# Patient Record
Sex: MALE | Race: WHITE | Employment: OTHER | URBAN - METROPOLITAN AREA
[De-identification: names, ages, dates, MRNs, and addresses within clinical notes are randomized per-mention and may not be internally consistent; named-entity substitution may affect disease eponyms.]

---

## 2017-02-21 ENCOUNTER — HOSPITAL ENCOUNTER (EMERGENCY)
Facility: CLINIC | Age: 72
Discharge: HOME | End: 2017-02-21
Attending: EMERGENCY MEDICINE | Admitting: EMERGENCY MEDICINE
Payer: MEDICARE

## 2017-02-21 VITALS
HEIGHT: 68 IN | SYSTOLIC BLOOD PRESSURE: 162 MMHG | WEIGHT: 180 LBS | TEMPERATURE: 97.2 F | BODY MASS INDEX: 27.28 KG/M2 | RESPIRATION RATE: 16 BRPM | OXYGEN SATURATION: 97 % | DIASTOLIC BLOOD PRESSURE: 100 MMHG | HEART RATE: 90 BPM

## 2017-02-21 DIAGNOSIS — N39.0 URINARY TRACT INFECTION, SITE UNSPECIFIED: ICD-10-CM

## 2017-02-21 LAB
ALBUMIN UR-MCNC: 100 MG/DL
ANION GAP SERPL CALCULATED.3IONS-SCNC: 5 MMOL/L (ref 3–14)
APPEARANCE UR: ABNORMAL
BASOPHILS # BLD AUTO: 0 10E9/L (ref 0–0.2)
BASOPHILS NFR BLD AUTO: 0.1 %
BILIRUB UR QL STRIP: NEGATIVE
BUN SERPL-MCNC: 11 MG/DL (ref 7–30)
CALCIUM SERPL-MCNC: 8.8 MG/DL (ref 8.5–10.1)
CHLORIDE SERPL-SCNC: 105 MMOL/L (ref 94–109)
CO2 SERPL-SCNC: 30 MMOL/L (ref 20–32)
COLOR UR AUTO: YELLOW
CREAT SERPL-MCNC: 0.95 MG/DL (ref 0.66–1.25)
DIFFERENTIAL METHOD BLD: ABNORMAL
EOSINOPHIL # BLD AUTO: 0 10E9/L (ref 0–0.7)
EOSINOPHIL NFR BLD AUTO: 0.5 %
ERYTHROCYTE [DISTWIDTH] IN BLOOD BY AUTOMATED COUNT: 12.5 % (ref 10–15)
GFR SERPL CREATININE-BSD FRML MDRD: 78 ML/MIN/1.7M2
GLUCOSE SERPL-MCNC: 112 MG/DL (ref 70–99)
GLUCOSE UR STRIP-MCNC: NEGATIVE MG/DL
HCT VFR BLD AUTO: 38.8 % (ref 40–53)
HGB BLD-MCNC: 13.8 G/DL (ref 13.3–17.7)
HGB UR QL STRIP: ABNORMAL
IMM GRANULOCYTES # BLD: 0 10E9/L (ref 0–0.4)
IMM GRANULOCYTES NFR BLD: 0.1 %
KETONES UR STRIP-MCNC: NEGATIVE MG/DL
LEUKOCYTE ESTERASE UR QL STRIP: ABNORMAL
LYMPHOCYTES # BLD AUTO: 0.5 10E9/L (ref 0.8–5.3)
LYMPHOCYTES NFR BLD AUTO: 7.1 %
MCH RBC QN AUTO: 30.7 PG (ref 26.5–33)
MCHC RBC AUTO-ENTMCNC: 35.6 G/DL (ref 31.5–36.5)
MCV RBC AUTO: 86 FL (ref 78–100)
MONOCYTES # BLD AUTO: 0.4 10E9/L (ref 0–1.3)
MONOCYTES NFR BLD AUTO: 5.9 %
MUCOUS THREADS #/AREA URNS LPF: PRESENT /LPF
NEUTROPHILS # BLD AUTO: 6.4 10E9/L (ref 1.6–8.3)
NEUTROPHILS NFR BLD AUTO: 86.3 %
NITRATE UR QL: NEGATIVE
NRBC # BLD AUTO: 0 10*3/UL
NRBC BLD AUTO-RTO: 0 /100
PH UR STRIP: 7.5 PH (ref 5–7)
PLATELET # BLD AUTO: 236 10E9/L (ref 150–450)
POTASSIUM SERPL-SCNC: 3.8 MMOL/L (ref 3.4–5.3)
RBC # BLD AUTO: 4.5 10E12/L (ref 4.4–5.9)
RBC #/AREA URNS AUTO: >182 /HPF (ref 0–2)
SODIUM SERPL-SCNC: 140 MMOL/L (ref 133–144)
SP GR UR STRIP: 1.01 (ref 1–1.03)
URN SPEC COLLECT METH UR: ABNORMAL
UROBILINOGEN UR STRIP-MCNC: NORMAL MG/DL (ref 0–2)
WBC # BLD AUTO: 7.4 10E9/L (ref 4–11)
WBC #/AREA URNS AUTO: >182 /HPF (ref 0–2)
WBC CLUMPS #/AREA URNS HPF: PRESENT /HPF

## 2017-02-21 PROCEDURE — 80048 BASIC METABOLIC PNL TOTAL CA: CPT | Performed by: EMERGENCY MEDICINE

## 2017-02-21 PROCEDURE — 51798 US URINE CAPACITY MEASURE: CPT

## 2017-02-21 PROCEDURE — 99283 EMERGENCY DEPT VISIT LOW MDM: CPT

## 2017-02-21 PROCEDURE — 99284 EMERGENCY DEPT VISIT MOD MDM: CPT

## 2017-02-21 PROCEDURE — 87086 URINE CULTURE/COLONY COUNT: CPT | Performed by: EMERGENCY MEDICINE

## 2017-02-21 PROCEDURE — 87088 URINE BACTERIA CULTURE: CPT | Performed by: EMERGENCY MEDICINE

## 2017-02-21 PROCEDURE — 85025 COMPLETE CBC W/AUTO DIFF WBC: CPT | Performed by: EMERGENCY MEDICINE

## 2017-02-21 PROCEDURE — A9270 NON-COVERED ITEM OR SERVICE: HCPCS | Mod: GY | Performed by: EMERGENCY MEDICINE

## 2017-02-21 PROCEDURE — 87186 SC STD MICRODIL/AGAR DIL: CPT | Performed by: EMERGENCY MEDICINE

## 2017-02-21 PROCEDURE — 81001 URINALYSIS AUTO W/SCOPE: CPT | Performed by: EMERGENCY MEDICINE

## 2017-02-21 PROCEDURE — 25000132 ZZH RX MED GY IP 250 OP 250 PS 637: Mod: GY | Performed by: EMERGENCY MEDICINE

## 2017-02-21 RX ORDER — CIPROFLOXACIN 500 MG/1
500 TABLET, FILM COATED ORAL ONCE
Status: COMPLETED | OUTPATIENT
Start: 2017-02-21 | End: 2017-02-21

## 2017-02-21 RX ORDER — CIPROFLOXACIN 500 MG/1
500 TABLET, FILM COATED ORAL 2 TIMES DAILY
Qty: 14 TABLET | Refills: 0 | Status: SHIPPED | OUTPATIENT
Start: 2017-02-21 | End: 2017-02-28

## 2017-02-21 RX ORDER — PHENAZOPYRIDINE HYDROCHLORIDE 200 MG/1
200 TABLET, FILM COATED ORAL 3 TIMES DAILY PRN
Qty: 9 TABLET | Refills: 0 | Status: SHIPPED | OUTPATIENT
Start: 2017-02-21 | End: 2017-02-24

## 2017-02-21 RX ORDER — CEFTRIAXONE 1 G/1
1 INJECTION, POWDER, FOR SOLUTION INTRAMUSCULAR; INTRAVENOUS ONCE
Status: DISCONTINUED | OUTPATIENT
Start: 2017-02-21 | End: 2017-02-21

## 2017-02-21 RX ADMIN — CIPROFLOXACIN HYDROCHLORIDE 500 MG: 500 TABLET, FILM COATED ORAL at 13:26

## 2017-02-21 ASSESSMENT — ENCOUNTER SYMPTOMS
DYSURIA: 1
ABDOMINAL PAIN: 0
BACK PAIN: 0
FREQUENCY: 1
HEMATURIA: 1
DIFFICULTY URINATING: 1

## 2017-02-21 NOTE — DISCHARGE INSTRUCTIONS
"   * BLADDER INFECTION: Male (Adult)    A bladder infection (\"cystitis\" or \"UTI\") usually causes a constant urge to urinate, and a burning when passing urine. Urine may be cloudy, smelly or dark. There may be also be pain in the lower abdomen.  Cystitis in males is not common. It may be caused by a partial blockage in the urinary system that keeps the bladder from emptying completely. This is most often related to an enlarged prostate gland.  HOME CARE:  1. Drink lots of fluids (at least 6-8 glasses a day). This will flush the bacteria out of your bladder. Cranberry juice has been shown to help clear out the bacteria.  2. Avoid sexual intercourse until your symptoms are gone.  3. A bladder infection is treated with antibiotics. You may also be given Pyridium (generic - phenazopyridine) to reduce burning with urination. This will cause urine to become a bright orange color, which can stain clothing.  FOLLOW UP with your doctor or this facility if ALL symptoms have not cleared within five days. It is important to keep your follow up appointment to discuss with your doctor the need for further tests of the urinary tract.  GET PROMPT MEDICAL ATTENTION if any of the following occur:    Fever over 101  F (38.3  C)    No improvement by the third day of treatment    Increasing back or abdominal pain    Repeated vomiting; unable to keep medicine down    Weakness, dizziness or fainting    7351-0458 [employer], [address] [place] [address], [place] [address]. All rights reserved. This information is not intended as a substitute for professional medical care. Always follow your healthcare professional's instructions.    "

## 2017-02-21 NOTE — ED AVS SNAPSHOT
"  Emergency Department    [address] [facility] [address]    Phone:  [phone]    Fax:  [phone]                                       Chuck Santacruz   MRN: [id number]    Department:   Emergency Department   Date of Visit:  2/21/2017           Patient Information     Date Of Birth          1945        Your diagnoses for this visit were:     Urinary tract infection, site unspecified        You were seen by Nat Jones MD.      Follow-up Information     Follow up with Evens Browning MD.    Specialty:  Urology    Why:  this week for recheck    Contact information:    [facility]  [address]   [address]  [facility] [address] [phone]          Follow up with  Emergency Department.    Specialty:  EMERGENCY MEDICINE    Why:  As needed, If symptoms worsen or for fevers or vomiting    Contact information:    [address] [facility] [address] [phone]        Discharge Instructions          * BLADDER INFECTION: Male (Adult)    A bladder infection (\"cystitis\" or \"UTI\") usually causes a constant urge to urinate, and a burning when passing urine. Urine may be cloudy, smelly or dark. There may be also be pain in the lower abdomen.  Cystitis in males is not common. It may be caused by a partial blockage in the urinary system that keeps the bladder from emptying completely. This is most often related to an enlarged prostate gland.  HOME CARE:  1. Drink lots of fluids (at least 6-8 glasses a day). This will flush the bacteria out of your bladder. Cranberry juice has been shown to help clear out the bacteria.  2. Avoid sexual intercourse until your symptoms are gone.  3. A bladder infection is treated with antibiotics. You may also be given Pyridium (generic - phenazopyridine) to reduce burning with urination. This will cause urine to become a bright orange color, which can stain clothing.  FOLLOW UP with your doctor or this facility if ALL symptoms have " not cleared within five days. It is important to keep your follow up appointment to discuss with your doctor the need for further tests of the urinary tract.  GET PROMPT MEDICAL ATTENTION if any of the following occur:    Fever over 101  F (38.3  C)    No improvement by the third day of treatment    Increasing back or abdominal pain    Repeated vomiting; unable to keep medicine down    Weakness, dizziness or fainting    9237-3816 [employer], [address] [place] [address], [place] [address]. All rights reserved. This information is not intended as a substitute for professional medical care. Always follow your healthcare professional's instructions.      24 Hour Appointment Hotline       To make an appointment at any [facility], call [phone] ([phone]). If you don't have a family doctor or clinic, we will help you find one. [facility] clinics are conveniently located to serve the needs of you and your family.             Review of your medicines      START taking        Dose / Directions Last dose taken    ciprofloxacin 500 MG tablet   Commonly known as:  CIPRO   Dose:  500 mg   Quantity:  14 tablet        Take 1 tablet (500 mg) by mouth 2 times daily for 7 days   Refills:  0        phenazopyridine 200 MG tablet   Commonly known as:  PYRIDIUM   Dose:  200 mg   Quantity:  9 tablet        Take 1 tablet (200 mg) by mouth 3 times daily as needed for irritation (pain with urination)   Refills:  0          Our records show that you are taking the medicines listed below. If these are incorrect, please call your family doctor or clinic.        Dose / Directions Last dose taken    VIAGRA PO   Dose:  100 mg        Take 100 mg by mouth daily as needed   Refills:  0        ZANTAC PO   Dose:  150 mg        Take 150 mg by mouth daily as needed for heartburn   Refills:  0                Prescriptions were sent or printed at these locations (2 Prescriptions)                   Other Prescriptions                 Printed at Department/Unit printer (2 of 2)         ciprofloxacin (CIPRO) 500 MG tablet               phenazopyridine (PYRIDIUM) 200 MG tablet                Procedures and tests performed during your visit     Procedure/Test Number of Times Performed    Basic metabolic panel 1    Bladder scan 1    CBC with platelets + differential 1    UA reflex to Microscopic 2    Urine Culture Aerobic Bacterial 1      Orders Needing Specimen Collection     None      Pending Results     Date and Time Order Name Status Description    2/21/2017 1136 Urine Culture Aerobic Bacterial In process             Pending Culture Results     Date and Time Order Name Status Description    2/21/2017 1136 Urine Culture Aerobic Bacterial In process              Test Results from your hospital stay     2/21/2017 11:48 AM - Interface, Flexilab Results         2/21/2017 12:17 PM - Interface, Flexilab Results      Component Results     Component Value Ref Range & Units Status    WBC 7.4 4.0 - 11.0 10e9/L Final    RBC Count 4.50 4.4 - 5.9 10e12/L Final    Hemoglobin 13.8 13.3 - 17.7 g/dL Final    Hematocrit 38.8 (L) 40.0 - 53.0 % Final    MCV 86 78 - 100 fl Final    MCH 30.7 26.5 - 33.0 pg Final    MCHC 35.6 31.5 - 36.5 g/dL Final    RDW 12.5 10.0 - 15.0 % Final    Platelet Count 236 150 - 450 10e9/L Final    Diff Method Automated Method  Final    % Neutrophils 86.3 % Final    % Lymphocytes 7.1 % Final    % Monocytes 5.9 % Final    % Eosinophils 0.5 % Final    % Basophils 0.1 % Final    % Immature Granulocytes 0.1 % Final    Nucleated RBCs 0 0 /100 Final    Absolute Neutrophil 6.4 1.6 - 8.3 10e9/L Final    Absolute Lymphocytes 0.5 (L) 0.8 - 5.3 10e9/L Final    Absolute Monocytes 0.4 0.0 - 1.3 10e9/L Final    Absolute Eosinophils 0.0 0.0 - 0.7 10e9/L Final    Absolute Basophils 0.0 0.0 - 0.2 10e9/L Final    Abs Immature Granulocytes 0.0 0 - 0.4 10e9/L Final    Absolute Nucleated RBC 0.0  Final         2/21/2017 12:29 PM - Interface, Flexilab Results       Component Results     Component Value Ref Range & Units Status    Sodium 140 133 - 144 mmol/L Final    Potassium 3.8 3.4 - 5.3 mmol/L Final    Chloride 105 94 - 109 mmol/L Final    Carbon Dioxide 30 20 - 32 mmol/L Final    Anion Gap 5 3 - 14 mmol/L Final    Glucose 112 (H) 70 - 99 mg/dL Final    Urea Nitrogen 11 7 - 30 mg/dL Final    Creatinine 0.95 0.66 - 1.25 mg/dL Final    GFR Estimate 78 >60 mL/min/1.7m2 Final    Non  GFR Calc    GFR Estimate If Black >90   GFR Calc   >60 mL/min/1.7m2 Final    Calcium 8.8 8.5 - 10.1 mg/dL Final         2/21/2017 12:49 PM - Interface, Flexilab Results      Component Results     Component Value Ref Range & Units Status    Color Urine Yellow  Final    Appearance Urine Slightly Cloudy  Final    Glucose Urine Negative NEG mg/dL Final    Bilirubin Urine Negative NEG Final    Ketones Urine Negative NEG mg/dL Final    Specific Gravity Urine 1.015 1.003 - 1.035 Final    Blood Urine Large (A) NEG Final    pH Urine 7.5 (H) 5.0 - 7.0 pH Final    Protein Albumin Urine 100 (A) NEG mg/dL Final    Urobilinogen mg/dL Normal 0.0 - 2.0 mg/dL Final    Nitrite Urine Negative NEG Final    Leukocyte Esterase Urine Large (A) NEG Final    Source Midstream Urine  Final    RBC Urine >182 (H) 0 - 2 /HPF Final    WBC Urine >182 (H) 0 - 2 /HPF Final    WBC Clumps Present (A) NEG /HPF Final    Mucous Urine Present (A) NEG /LPF Final                Clinical Quality Measure: Blood Pressure Screening     Your blood pressure was checked while you were in the emergency department today. The last reading we obtained was  BP: (!) 174/104 . Please read the guidelines below about what these numbers mean and what you should do about them.  If your systolic blood pressure (the top number) is less than 120 and your diastolic blood pressure (the bottom number) is less than 80, then your blood pressure is normal. There is nothing more that you need to do about it.  If your systolic blood  "pressure (the top number) is 120-139 or your diastolic blood pressure (the bottom number) is 80-89, your blood pressure may be higher than it should be. You should have your blood pressure rechecked within a year by a primary care provider.  If your systolic blood pressure (the top number) is 140 or greater or your diastolic blood pressure (the bottom number) is 90 or greater, you may have high blood pressure. High blood pressure is treatable, but if left untreated over time it can put you at risk for heart attack, stroke, or kidney failure. You should have your blood pressure rechecked by a primary care provider within the next 4 weeks.  If your provider in the emergency department today gave you specific instructions to follow-up with your doctor or provider even sooner than that, you should follow that instruction and not wait for up to 4 weeks for your follow-up visit.        Thank you for choosing [facility]       Thank you for choosing [facility] for your care. Our goal is always to provide you with excellent care. Hearing back from our patients is one way we can continue to improve our services. Please take a few minutes to complete the written survey that you may receive in the mail after you visit with us. Thank you!        [employer] Information     [employer] lets you send messages to your doctor, view your test results, renew your prescriptions, schedule appointments and more. To sign up, go to [email or web address] . Click on \"Log in\" on the left side of the screen, which will take you to the Welcome page. Then click on \"Sign up Now\" on the right side of the page.     You will be asked to enter the access code listed below, as well as some personal information. Please follow the directions to create your username and password.     Your access code is: [email or web address]  Expires: 2017 12:27 PM     Your access code will  in 90 days. If you need help or a new code, please call your [facility] clinic or " [phone].        Care EveryWhere ID     This is your Care EveryWhere ID. This could be used by other organizations to access your [facility] medical records  [id number]        After Visit Summary       This is your record. Keep this with you and show to your community pharmacist(s) and doctor(s) at your next visit.

## 2017-02-21 NOTE — ED AVS SNAPSHOT
Emergency Department    [address] [place] [address]    Phone:  [phone]    Fax:  [phone]                                       Chuck Santacruz   MRN: [id number]    Department:   Emergency Department   Date of Visit:  2/21/2017           After Visit Summary Signature Page     I have received my discharge instructions, and my questions have been answered. I have discussed any challenges I see with this plan with the nurse or doctor.    ..........................................................................................................................................  Patient/Patient Representative Signature      ..........................................................................................................................................  Patient Representative Print Name and Relationship to Patient    ..................................................               ................................................  Date                                            Time    ..........................................................................................................................................  Reviewed by Signature/Title    ...................................................              ..............................................  Date                                                            Time

## 2017-02-21 NOTE — ED PROVIDER NOTES
"  History     Chief Complaint:  Urinary Retention    HPI   Chuck Santacruz is a 71 year old male who presents to the emergency department today for evaluation of urinary retention. The patient reports that he had a prostatectomy over a year ago for prostate cancer that was performed by Dr. Browning. The patient reports that he recovered well from this procedure, however he reports that ever since his prostate cancer he has not had normal urinary function. He states that he thinks the radiation \"messed the whole system up.\" Recently, the patient has felt a constant urgency to urinate but not much urine comes out. The patient reports that last night he was waking up every half hour to urinate because he feels such urgency, but he states that he doesn't feel like he is full of urine. He reports that he has noticed some dysuria and he has noticed some whitish substance in his urine. He reports that he contacted his primary care physician who told him to present to the emergency department. The patient has no history of urine infections and he denies any back pain or abdominal pain. This morning the patient noted some brownish-yellowish discharge in a portion of his urine.     Allergies:  Atorvastatin  Citalopram Hydrobromide [Citalopram]  Sertraline     Medications:    Zantac  Viagra    Past Medical History:    Erectile Dysfunction  GERD  Hiatal Hernia  Hyperlipidemia  Irritable Bowel Syndrome  Multiple adenomatous polyps  Prostate cancer  Tubular adenoma of colon    Past Surgical History:    Small bowel resection  Knee arthroscopy  Eye surgery - cataract  DaVinci Prostatectomy    Family History:    No family history on file.    Social History:  Smoking Status: Never Smoker  Alcohol Use: Positive  Marital Status:  Single [1]     Review of Systems   Gastrointestinal: Negative for abdominal pain.   Genitourinary: Positive for decreased urine volume, difficulty urinating, discharge, dysuria, frequency, hematuria and urgency. " "  Musculoskeletal: Negative for back pain.   All other systems reviewed and are negative.    Physical Exam   Vitals:  Patient Vitals for the past 24 hrs:   BP Temp Temp src Pulse Heart Rate Resp SpO2 Height Weight   02/21/17 1106 (!) 174/104 97.2  F (36.2  C) Temporal 90 90 18 97 % 1.727 m (5' 8\") 81.6 kg (180 lb)      Physical Exam   Nursing note and vitals reviewed.  Constitutional:  Appears well-developed and well-nourished.   HENT:   Head:    Atraumatic.   Mouth/Throat:   Oropharynx is clear and moist. No oropharyngeal exudate.   Eyes:    Pupils are equal, round, and reactive to light.   Neck:    Normal range of motion. Neck supple.      No tracheal deviation present. No thyromegaly present.   Cardiovascular:  Normal rate, regular rhythm, no murmur   Pulmonary/Chest: Breath sounds are clear and equal without wheezes or crackles.  Abdominal:   Soft. Bowel sounds are normal. Exhibits no distension and      no mass. There is no tenderness.      There is no rebound and no guarding.   Musculoskeletal:  Exhibits no edema.   Lymphadenopathy:  No cervical adenopathy.   Neurological:   Alert and oriented to person, place, and time.   Skin:    Skin is warm and dry. No rash noted. No pallor.   :   Circumcised penis appears normal. No drainage.     Emergency Department Course     Laboratory:  Laboratory findings were communicated with the patient who voiced understanding of the findings.    CBC: WBC 7.4, HGB 13.8, )   BMP: Glucose 112 (H) o/w WNL (Creatinine 0.95)  UA with Microscopic reflex to Culture: Blood: Large (A), pH: 7.5 (H), Protein Albumin: 100 (A),  Leukocyte Esterase: Large (A), RBC/HPF: >182 (H), WBC/HPF: >182 (H), WBC Clumps:  Present (A), Mucous: Present (A)  Urine Culture Aerobic Bacterial: Pending    Interventions:  1326 Cipro 500 mg PO    Emergency Department Course:  Nursing notes and vitals reviewed.  I performed an exam of the patient as documented above.   The patient provided a urine sample " here in the emergency department. This was sent for laboratory testing, findings above.  I discussed the treatment plan with the patient. They expressed understanding of this plan and consented to discharge. They will be discharged home with instructions for care and follow up. In addition, the patient will return to the emergency department if their symptoms persist, worsen, if new symptoms arise or if there is any concern.  All questions were answered.  I personally reviewed the lab results with the patient and answered all related questions prior to discharge.  Impression & Plan      Medical Decision Making:  I found the patient to have symptoms and urinalysis consistent with an acute urinary tract infection. I considered kidney stone in the differential but I felt that this was unlikely. The patient was given Cipro, 500 mg PO here, and he was not having any sign of urinary obstruction. His bladder scan did not show any post void residual so I felt that he could be safely discharged to home. His kidney function is normal and he does not appear to have any signs of sepsis. He was prescribed Cipro 500 mg twice daily for seven days and Pyridium for burning with urination and instructed to drink plenty of fluids and told to follow up with his urologist Dr. Browning later this week and instructed on signs and symptoms to return to the emergency department for.     Diagnosis:    ICD-10-CM    1. Urinary tract infection, site unspecified N39.0      Disposition:   The patient is discharged to home.    Discharge Medications:  New Prescriptions    CIPROFLOXACIN (CIPRO) 500 MG TABLET    Take 1 tablet (500 mg) by mouth 2 times daily for 7 days    PHENAZOPYRIDINE (PYRIDIUM) 200 MG TABLET    Take 1 tablet (200 mg) by mouth 3 times daily as needed for irritation (pain with urination)     Scribe Disclosure:  Chandler VILLA, am serving as a scribe at 11:26 AM on 2/21/2017 to document services personally performed by Nat Jones,  MD, based on my observations and the provider's statements to me.    2/21/2017    EMERGENCY DEPARTMENT       Nat Jones MD  02/21/17 2004

## 2017-02-23 LAB
BACTERIA SPEC CULT: ABNORMAL
Lab: ABNORMAL
MICRO REPORT STATUS: ABNORMAL
MICROORGANISM SPEC CULT: ABNORMAL
SPECIMEN SOURCE: ABNORMAL

## 2017-02-24 ENCOUNTER — TELEPHONE (OUTPATIENT)
Dept: EMERGENCY MEDICINE | Facility: CLINIC | Age: 72
End: 2017-02-24

## 2017-02-24 NOTE — TELEPHONE ENCOUNTER
"[facility] Emergency Department Lab result notification:    [facility] ED lab result protocol used  Urine Culture    Reason for call  Notify of lab results, assess symptoms,  review ED providers recommendations/discharge instructions (if necessary) and advise per ED lab result f/u protocol    Lab Result  Final Urine Culture Report on 2/23/17  [facility] ED discharge antibiotic: Ciprofloxacin (Cipro) 500 mg tablet, 1 tablet (500 mg) by mouth 2 times daily for 7 days  #1. Bacteria, >100,000 colonies/mL Escherichia coli, is SUSCEPTIBLE to ED discharge antibiotic.    As per [facility] ED Lab Result protocol, no change in antibiotic therapy.    Information table from ED Provider visit on 2/21/17  Symptoms reported at ED visit (Chief complaint, HPI) Chuck Santacruz is a 71 year old male who presents to the emergency department today for evaluation of urinary retention. The patient reports that he had a prostatectomy over a year ago for prostate cancer that was performed by Dr. Browning. The patient reports that he recovered well from this procedure, however he reports that ever since his prostate cancer he has not had normal urinary function. He states that he thinks the radiation \"messed the whole system up.\" Recently, the patient has felt a constant urgency to urinate but not much urine comes out. The patient reports that last night he was waking up every half hour to urinate because he feels such urgency, but he states that he doesn't feel like he is full of urine. He reports that he has noticed some dysuria and he has noticed some whitish substance in his urine. He reports that he contacted his primary care physician who told him to present to the emergency department. The patient has no history of urine infections and he denies any back pain or abdominal pain. This morning the patient noted some brownish-yellowish discharge in a portion of his urine.    ED providers Impression and Plan (applicable " information) I found the patient to have symptoms and urinalysis consistent with an acute urinary tract infection. I considered kidney stone in the differential but I felt that this was unlikely. The patient was given Cipro, 500 mg PO here, and he was not having any sign of urinary obstruction. His bladder scan did not show any post void residual so I felt that he could be safely discharged to home. His kidney function is normal and he does not appear to have any signs of sepsis. He was prescribed Cipro 500 mg twice daily for seven days and Pyridium for burning with urination and instructed to drink plenty of fluids and told to follow up with his urologist Dr. Browning later this week and instructed on signs and symptoms to return to the emergency department for.    Miscellaneous information N/A     RN Assessment (Patient s current Symptoms), include time called.  [Insert Left message here if message left]  Msg left at 1:34pm.         Radha Berger RN    [facility] RN  Lung Nodule and ED Lab Results F/U RN  Epic pool (ED late result f/u RN) : P [id number]   # [phone]    Copy of Lab result   Order   Urine Culture Aerobic Bacterial [[id number]] (Order [id number])   Exam Information   Exam Date Exam Time Accession # Results    2/21/17 11:08 AM [id number]    Component Results   Component Collected Lab   Specimen Description 02/21/2017 11:08 AM [id number]   Midstream Urine   Special Requests 02/21/2017 11:08 AM 75   Specimen received in preservative   Culture Micro (Abnormal) 02/21/2017 11:08    >100,000 colonies/mL Escherichia coli   Micro Report Status 02/21/2017 11:08    FINAL 02/23/2017   Organism: 02/21/2017 11:08    >100,000 colonies/mL Escherichia coli   Culture & Susceptibility   >100,000 COLONIES/ML ESCHERICHIA COLI (ANDI)   Antibiotic Sensitivity Unit Status   AMPICILLIN <=2 Susceptible ug/mL Final   AMPICILLIN/SULBACTAM <=2 Susceptible ug/mL Final   CEFAZOLIN <=4  Susceptible  Cefazolin ANDI breakpoints are for the treatment of uncomplicated urinary tract   infections.  For the treatment of systemic infections, please contact the   laboratory for additional testing. ug/mL Final   CEFEPIME <=1 Susceptible ug/mL Final   CEFOXITIN <=4 Susceptible ug/mL Final   CEFTAZIDIME <=1 Susceptible ug/mL Final   CEFTRIAXONE <=1 Susceptible ug/mL Final   CIPROFLOXACIN <=0.25 Susceptible ug/mL Final   GENTAMICIN <=1 Susceptible ug/mL Final   LEVOFLOXACIN <=0.12 Susceptible ug/mL Final   NITROFURANTOIN <=16 Susceptible ug/mL Final   Piperacillin/Tazo <=4 Susceptible ug/mL Final   TOBRAMYCIN <=1 Susceptible ug/mL Final   Trimethoprim/Sulfa <=1/19 Susceptible ug/mL Final

## 2022-10-07 NOTE — PROGRESS NOTES
"BP:179/85   Disposition: provider notified while patient in the clinic  Recheck BP: 171/90      SUBJECTIVE:   Chuck is a 77 year old who presents for Preventive Visit.  Pt is new to me as well as the clinic/system: moved here recently from [facility].      Prostate CA:  S/p prostatectomy.  Rtx also.  Due for surveillance PSA.      Anxiety:  Describes himself as an anxious person.  Especially at present. 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      BP:  Noted below.  He exercises regularly.  He notes that typically his BP does run a bit high.  Gets 140-150s systolic.        Patient has been advised of split billing requirements and indicates understanding: Yes  Are you in the first 12 months of your Medicare coverage?  No    Healthy Habits:     In general, how would you rate your overall health?  Excellent    Frequency of exercise:  6-7 days/week    Duration of exercise:  45-60 minutes    Do you usually eat at least 4 servings of fruit and vegetables a day, include whole grains    & fiber and avoid regularly eating high fat or \"junk\" foods?  Yes    Taking medications regularly:  Yes    Barriers to taking medications:  Not applicable    Medication side effects:  None    Ability to successfully perform activities of daily living:  No assistance needed    Home Safety:  No safety concerns identified    Hearing Impairment:  No hearing concerns    In the past 6 months, have you been bothered by leaking of urine? Yes    In general, how would you rate your overall mental or emotional health?  Good      PHQ-2 Total Score: 0    Additional concerns today:  No    Do you feel safe in your environment? Yes    Have you ever done Advance Care Planning? (For example, a Health Directive, POLST, or a discussion with a medical provider or your loved ones about your wishes): Yes, advance care planning is on file.       Fall risk  Fallen 2 or more times in the past year?: No  Any fall with injury in " the past year?: No    Cognitive Screening   1) Repeat 3 items (Leader, Season, Table)    2) Clock draw: NORMAL  3) 3 item recall: Recalls 2 objects   Results: NORMAL clock, 1-2 items recalled: COGNITIVE IMPAIRMENT LESS LIKELY    Mini-CogTM Copyright MARCELINO Curiel. Licensed by the author for use in [facility]; reprinted with permission (mahsa@[facility]). All rights reserved.      Do you have sleep apnea, excessive snoring or daytime drowsiness?: no    Reviewed and updated as needed this visit by clinical staff   Tobacco  Allergies               Reviewed and updated as needed this visit by Provider                 Social History     Tobacco Use     Smoking status: Never     Smokeless tobacco: Never   Substance Use Topics     Alcohol use: Yes     Comment: 2 beers per week     If you drink alcohol do you typically have >3 drinks per day or >7 drinks per week? No    Alcohol Use 10/10/2022   Prescreen: >3 drinks/day or >7 drinks/week? No   Prescreen: >3 drinks/day or >7 drinks/week? -   No flowsheet data found.        Current providers sharing in care for this patient include:   Patient Care Team:  Car Marshall MD as PCP - General (Internal Medicine)    The following health maintenance items are reviewed in Epic and correct as of today:  Health Maintenance   Topic Date Due     ANNUAL REVIEW OF HM ORDERS  Never done     HEPATITIS B IMMUNIZATION (1 of 3 - 3-dose series) Never done     HEPATITIS C SCREENING  Never done     LIPID  Never done     ZOSTER IMMUNIZATION (1 of 2) Never done     COVID-19 Vaccine (5 - Booster for Moderna series) 06/03/2022     MEDICARE ANNUAL WELLNESS VISIT  10/10/2023     FALL RISK ASSESSMENT  10/10/2023     ADVANCE CARE PLANNING  10/10/2027     DTAP/TDAP/TD IMMUNIZATION (3 - Td or Tdap) 04/10/2028     PHQ-2 (once per calendar year)  Completed     INFLUENZA VACCINE  Completed     Pneumococcal Vaccine: 65+ Years  Completed     IPV IMMUNIZATION  Aged Out     MENINGITIS IMMUNIZATION  Aged  "Out     Lab work is in process          Review of Systems   Constitutional: Negative for chills and fever.   HENT: Negative for congestion, ear pain, hearing loss and sore throat.    Eyes: Negative for pain and visual disturbance.   Respiratory: Negative for cough and shortness of breath.    Cardiovascular: Negative for chest pain, palpitations and peripheral edema.   Gastrointestinal: Negative for abdominal pain, constipation, diarrhea, heartburn, hematochezia and nausea.   Genitourinary: Positive for impotence. Negative for dysuria, frequency, genital sores, hematuria, penile discharge and urgency.   Musculoskeletal: Negative for arthralgias, joint swelling and myalgias.   Skin: Negative for rash.   Neurological: Negative for dizziness, weakness, headaches and paresthesias.   Psychiatric/Behavioral: Negative for mood changes. The patient is nervous/anxious.          OBJECTIVE:   BP (!) 166/94   Pulse 66   Temp 98.7  F (37.1  C) (Temporal)   Resp 16   Ht 1.727 m (5' 8\")   Wt 79.8 kg (176 lb)   SpO2 99%   BMI 26.76 kg/m   Estimated body mass index is 26.76 kg/m  as calculated from the following:    Height as of this encounter: 1.727 m (5' 8\").    Weight as of this encounter: 79.8 kg (176 lb).  Physical Exam  GENERAL: healthy, alert and no distress  NECK: no adenopathy, no asymmetry, masses, or scars and thyroid normal to palpation  RESP: lungs clear to auscultation - no rales, rhonchi or wheezes  CV: regular rate and rhythm, normal S1 S2, no S3 or S4, no murmur, click or rub, no peripheral edema and peripheral pulses strong  ABDOMEN: soft, nontender, no hepatosplenomegaly, no masses and bowel sounds normal  MS: no gross musculoskeletal defects noted, no edema        ASSESSMENT / PLAN:       ICD-10-CM    1. Encounter for Medicare annual wellness exam   Age and gender appropriate preventive care and screenings are discussed.  Particular attention to personal preventive care and age appropriate lifestyle " "including the incorporation of healthy diet and physical activity is made.     Z00.00       2. Need for hepatitis C screening test  Z11.59 Hepatitis C Screen Reflex to HCV RNA Quant and Genotype      3. Screening for hyperlipidemia  Z13.220 Lipid panel reflex to direct LDL Non-fasting      4. CA of prostate (HCC)   Check PSA C61 Comprehensive metabolic panel (BMP + Alb, Alk Phos, ALT, AST, Total. Bili, TP)     TSH with free T4 reflex      5. Screening for prostate cancer   As above Z12.5 PSA, screen      6. Primary hypertension   Given BP, anxiety and past Bps per his report would suggest medication.  He is agreeable.  Given anxiety will trial low dose beta blocker.  RTC 4 weeks.   I10 Comprehensive metabolic panel (BMP + Alb, Alk Phos, ALT, AST, Total. Bili, TP)     TSH with free T4 reflex     metoprolol succinate ER (TOPROL XL) 25 MG 24 hr tablet     CBC with platelets and differential              COUNSELING:  Reviewed preventive health counseling, as reflected in patient instructions    Estimated body mass index is 26.76 kg/m  as calculated from the following:    Height as of this encounter: 1.727 m (5' 8\").    Weight as of this encounter: 79.8 kg (176 lb).        He reports that he has never smoked. He has never used smokeless tobacco.      Appropriate preventive services were discussed with this patient, including applicable screening as appropriate for cardiovascular disease, diabetes, osteopenia/osteoporosis, and glaucoma.  As appropriate for age/gender, discussed screening for colorectal cancer, prostate cancer, breast cancer, and cervical cancer. Checklist reviewing preventive services available has been given to the patient.    Reviewed patients plan of care and provided an AVS. The Basic Care Plan (routine screening as documented in Health Maintenance) for Chuck meets the Care Plan requirement. This Care Plan has been established and reviewed with the Patient.    Car Marshall MD  [facility] " JOSELYN CHRISTOPHER    Identified Health Risks:    Information on urinary incontinence and treatment options given to patient.

## 2022-10-10 ENCOUNTER — OFFICE VISIT (OUTPATIENT)
Dept: FAMILY MEDICINE | Facility: CLINIC | Age: 77
End: 2022-10-10
Payer: MEDICARE

## 2022-10-10 VITALS
HEIGHT: 68 IN | DIASTOLIC BLOOD PRESSURE: 94 MMHG | TEMPERATURE: 98.7 F | OXYGEN SATURATION: 99 % | BODY MASS INDEX: 26.67 KG/M2 | HEART RATE: 66 BPM | SYSTOLIC BLOOD PRESSURE: 166 MMHG | RESPIRATION RATE: 16 BRPM | WEIGHT: 176 LBS

## 2022-10-10 DIAGNOSIS — C61 CA OF PROSTATE (H): ICD-10-CM

## 2022-10-10 DIAGNOSIS — Z00.00 ENCOUNTER FOR MEDICARE ANNUAL WELLNESS EXAM: Primary | ICD-10-CM

## 2022-10-10 DIAGNOSIS — Z13.220 SCREENING FOR HYPERLIPIDEMIA: ICD-10-CM

## 2022-10-10 DIAGNOSIS — Z12.5 SCREENING FOR PROSTATE CANCER: ICD-10-CM

## 2022-10-10 DIAGNOSIS — Z11.59 NEED FOR HEPATITIS C SCREENING TEST: ICD-10-CM

## 2022-10-10 DIAGNOSIS — I10 PRIMARY HYPERTENSION: ICD-10-CM

## 2022-10-10 PROCEDURE — 99203 OFFICE O/P NEW LOW 30 MIN: CPT | Mod: 25 | Performed by: INTERNAL MEDICINE

## 2022-10-10 PROCEDURE — G0439 PPPS, SUBSEQ VISIT: HCPCS | Performed by: INTERNAL MEDICINE

## 2022-10-10 RX ORDER — METOPROLOL SUCCINATE 25 MG/1
12.5 TABLET, EXTENDED RELEASE ORAL DAILY
Qty: 30 TABLET | Refills: 0 | Status: SHIPPED | OUTPATIENT
Start: 2022-10-10 | End: 2022-11-15

## 2022-10-10 ASSESSMENT — ENCOUNTER SYMPTOMS
HEMATURIA: 0
CHILLS: 0
DIZZINESS: 0
HEMATOCHEZIA: 0
SORE THROAT: 0
CONSTIPATION: 0
HEADACHES: 0
PARESTHESIAS: 0
EYE PAIN: 0
DYSURIA: 0
FREQUENCY: 0
NAUSEA: 0
NERVOUS/ANXIOUS: 1
ABDOMINAL PAIN: 0
JOINT SWELLING: 0
HEARTBURN: 0
SHORTNESS OF BREATH: 0
FEVER: 0
PALPITATIONS: 0
WEAKNESS: 0
MYALGIAS: 0
COUGH: 0
DIARRHEA: 0
ARTHRALGIAS: 0

## 2022-10-10 ASSESSMENT — PAIN SCALES - GENERAL: PAINLEVEL: NO PAIN (0)

## 2022-10-10 ASSESSMENT — ACTIVITIES OF DAILY LIVING (ADL): CURRENT_FUNCTION: NO ASSISTANCE NEEDED

## 2022-10-10 NOTE — PATIENT INSTRUCTIONS
BLOOD tests     START:  Metoprolol once a day    DENTIST:  Nasima Malone  [address], [address]  [place], [place] [address]  P [phone]    See me in 4 weeks    Patient Education   Personalized Prevention Plan  You are due for the preventive services outlined below.  Your care team is available to assist you in scheduling these services.  If you have already completed any of these items, please share that information with your care team to update in your medical record.  Health Maintenance Due   Topic Date Due    ANNUAL REVIEW OF HM ORDERS  Never done    Hepatitis B Vaccine (1 of 3 - 3-dose series) Never done    Hepatitis C Screening  Never done    Cholesterol Lab  Never done    Zoster (Shingles) Vaccine (1 of 2) Never done    COVID-19 Vaccine (5 - Booster for Moderna series) 06/03/2022       Urinary Incontinence (Male)    Urinary incontinence means not being able to control the release of urine from the bladder.   Causes  Common causes of urinary incontinence in men include:  Infection  Certain medicines  Aging  Poor pelvic muscle tone  Bladder spasms  Obesity  Trouble urinating and fully emptying the bladder (urinary retention)  Other things that can cause incontinence are:   Nervous system diseases  Diabetes  Sleep apnea  Urinary tract infections  Prostate surgery  Pelvic injury  Constipation and smoking have also been identified as risk factors.   Symptoms  Urge incontinence (overactive bladder). This is a sudden urge to urinate. It occurs even though there may not be much urine in the bladder. The need to urinate often during the night is common. It's due to bladder spasms.  Stress incontinence. This is urine leakage that you can't control. It can occur with sneezing, coughing, and other actions that put stress on the bladder.    Treatment  Treatment depends on what is causing the condition. Bladder infections are treated with antibiotics. Urinary retention is treated with a bladder catheter.   Home  care  Follow these guidelines when caring for yourself at home:  Don't have any foods and drinks that may irritate the bladder. This includes:  Chocolate  Alcohol  Caffeine  Carbonated drinks  Acidic fruits and juices  Limit fluids to 6 to 8 cups a day.  Lose weight if you are overweight. This will reduce your symptoms.  If advised, do regular pelvic muscle-strengthening exercises such as Kegel exercises.  If needed, wear absorbent pads to catch urine. Change the pads often. This is for good hygiene and to prevent skin and bladder infections.  Bathe daily for good hygiene.  If an antibiotic was prescribed to treat a bladder infection, take it until it's finished. Keep taking it even if you are feeling better. This is to make sure your infection has cleared.  If a catheter was left in place, keep bacteria from getting into the collection bag. Don't disconnect the catheter from the collection bag.  Use a leg band to secure the catheter drainage tube, so it does not pull on the catheter. Drain the collection bag when it becomes full. To do this, use the drain spout at the bottom of the bag. Don't disconnect the bag from the catheter.  Don't pull on or try to remove a catheter. The catheter must be removed by a healthcare provider.  If you smoke, stop. Ask your provider for help if you can't do this on your own.  Follow-up care  Follow up with your healthcare provider, or as advised.  When to get medical advice  Call your healthcare provider right away if any of these occur:  Fever over 100.4 F (38 C), or as directed by your provider  Bladder pain or fullness  Belly swelling, nausea, or vomiting  Back pain  Weakness, dizziness, or fainting  If a catheter was left in place, return if:  The catheter falls out  The catheter stops draining for 6 hours  Your urine gets cloudy or smells bad  [employer] last reviewed this educational content on 1/1/2020 2000-2021 [employer]. All rights reserved. This  information is not intended as a substitute for professional medical care. Always follow your healthcare professional's instructions.

## 2022-10-13 ENCOUNTER — LAB (OUTPATIENT)
Dept: LAB | Facility: CLINIC | Age: 77
End: 2022-10-13
Payer: MEDICARE

## 2022-10-13 DIAGNOSIS — Z12.5 SCREENING FOR PROSTATE CANCER: ICD-10-CM

## 2022-10-13 DIAGNOSIS — C61 CA OF PROSTATE (H): ICD-10-CM

## 2022-10-13 DIAGNOSIS — Z13.220 SCREENING FOR HYPERLIPIDEMIA: ICD-10-CM

## 2022-10-13 DIAGNOSIS — I10 PRIMARY HYPERTENSION: ICD-10-CM

## 2022-10-13 DIAGNOSIS — Z11.59 NEED FOR HEPATITIS C SCREENING TEST: ICD-10-CM

## 2022-10-13 LAB
ALBUMIN SERPL-MCNC: 4.1 G/DL (ref 3.4–5)
ALP SERPL-CCNC: 81 U/L (ref 40–150)
ALT SERPL W P-5'-P-CCNC: 21 U/L (ref 0–70)
ANION GAP SERPL CALCULATED.3IONS-SCNC: 8 MMOL/L (ref 3–14)
AST SERPL W P-5'-P-CCNC: 23 U/L (ref 0–45)
BASOPHILS # BLD AUTO: 0 10E3/UL (ref 0–0.2)
BASOPHILS NFR BLD AUTO: 0 %
BILIRUB SERPL-MCNC: 0.5 MG/DL (ref 0.2–1.3)
BUN SERPL-MCNC: 9 MG/DL (ref 7–30)
CALCIUM SERPL-MCNC: 9.5 MG/DL (ref 8.5–10.1)
CHLORIDE BLD-SCNC: 102 MMOL/L (ref 94–109)
CHOLEST SERPL-MCNC: 223 MG/DL
CO2 SERPL-SCNC: 27 MMOL/L (ref 20–32)
CREAT SERPL-MCNC: 0.93 MG/DL (ref 0.66–1.25)
EOSINOPHIL # BLD AUTO: 0.1 10E3/UL (ref 0–0.7)
EOSINOPHIL NFR BLD AUTO: 1 %
ERYTHROCYTE [DISTWIDTH] IN BLOOD BY AUTOMATED COUNT: 12.5 % (ref 10–15)
FASTING STATUS PATIENT QL REPORTED: YES
GFR SERPL CREATININE-BSD FRML MDRD: 84 ML/MIN/1.73M2
GLUCOSE BLD-MCNC: 109 MG/DL (ref 70–99)
HCT VFR BLD AUTO: 44.5 % (ref 40–53)
HDLC SERPL-MCNC: 55 MG/DL
HGB BLD-MCNC: 15 G/DL (ref 13.3–17.7)
IMM GRANULOCYTES # BLD: 0 10E3/UL
IMM GRANULOCYTES NFR BLD: 0 %
LDLC SERPL CALC-MCNC: 138 MG/DL
LYMPHOCYTES # BLD AUTO: 0.9 10E3/UL (ref 0.8–5.3)
LYMPHOCYTES NFR BLD AUTO: 20 %
MCH RBC QN AUTO: 30.3 PG (ref 26.5–33)
MCHC RBC AUTO-ENTMCNC: 33.7 G/DL (ref 31.5–36.5)
MCV RBC AUTO: 90 FL (ref 78–100)
MONOCYTES # BLD AUTO: 0.4 10E3/UL (ref 0–1.3)
MONOCYTES NFR BLD AUTO: 8 %
NEUTROPHILS # BLD AUTO: 3.4 10E3/UL (ref 1.6–8.3)
NEUTROPHILS NFR BLD AUTO: 71 %
NONHDLC SERPL-MCNC: 168 MG/DL
PLATELET # BLD AUTO: 327 10E3/UL (ref 150–450)
POTASSIUM BLD-SCNC: 4 MMOL/L (ref 3.4–5.3)
PROT SERPL-MCNC: 7.9 G/DL (ref 6.8–8.8)
PSA SERPL-MCNC: <0.01 UG/L (ref 0–4)
RBC # BLD AUTO: 4.95 10E6/UL (ref 4.4–5.9)
SODIUM SERPL-SCNC: 137 MMOL/L (ref 133–144)
TRIGL SERPL-MCNC: 151 MG/DL
TSH SERPL DL<=0.005 MIU/L-ACNC: 1.83 MU/L (ref 0.4–4)
WBC # BLD AUTO: 4.8 10E3/UL (ref 4–11)

## 2022-10-13 PROCEDURE — 80061 LIPID PANEL: CPT

## 2022-10-13 PROCEDURE — 80053 COMPREHEN METABOLIC PANEL: CPT

## 2022-10-13 PROCEDURE — 86803 HEPATITIS C AB TEST: CPT

## 2022-10-13 PROCEDURE — 36415 COLL VENOUS BLD VENIPUNCTURE: CPT

## 2022-10-13 PROCEDURE — 84443 ASSAY THYROID STIM HORMONE: CPT

## 2022-10-13 PROCEDURE — G0103 PSA SCREENING: HCPCS

## 2022-10-13 PROCEDURE — 85025 COMPLETE CBC W/AUTO DIFF WBC: CPT

## 2022-10-14 LAB — HCV AB SERPL QL IA: NONREACTIVE

## 2022-11-15 ENCOUNTER — OFFICE VISIT (OUTPATIENT)
Dept: FAMILY MEDICINE | Facility: CLINIC | Age: 77
End: 2022-11-15
Payer: MEDICARE

## 2022-11-15 VITALS
TEMPERATURE: 98.5 F | DIASTOLIC BLOOD PRESSURE: 77 MMHG | HEIGHT: 68 IN | RESPIRATION RATE: 18 BRPM | OXYGEN SATURATION: 99 % | WEIGHT: 175 LBS | SYSTOLIC BLOOD PRESSURE: 158 MMHG | HEART RATE: 72 BPM | BODY MASS INDEX: 26.52 KG/M2

## 2022-11-15 DIAGNOSIS — E78.5 DYSLIPIDEMIA: ICD-10-CM

## 2022-11-15 DIAGNOSIS — R31.0 GROSS HEMATURIA: ICD-10-CM

## 2022-11-15 DIAGNOSIS — I10 PRIMARY HYPERTENSION: Primary | ICD-10-CM

## 2022-11-15 DIAGNOSIS — R30.0 DYSURIA: ICD-10-CM

## 2022-11-15 LAB
ALBUMIN UR-MCNC: 100 MG/DL
APPEARANCE UR: ABNORMAL
BACTERIA #/AREA URNS HPF: ABNORMAL /HPF
BILIRUB UR QL STRIP: NEGATIVE
COLOR UR AUTO: ABNORMAL
GLUCOSE UR STRIP-MCNC: NEGATIVE MG/DL
HGB UR QL STRIP: ABNORMAL
KETONES UR STRIP-MCNC: NEGATIVE MG/DL
LEUKOCYTE ESTERASE UR QL STRIP: ABNORMAL
NITRATE UR QL: NEGATIVE
PH UR STRIP: 6 [PH] (ref 5–7)
RBC #/AREA URNS AUTO: ABNORMAL /HPF
SP GR UR STRIP: 1.02 (ref 1–1.03)
UROBILINOGEN UR STRIP-ACNC: 0.2 E.U./DL
WBC #/AREA URNS AUTO: >100 /HPF

## 2022-11-15 PROCEDURE — 87086 URINE CULTURE/COLONY COUNT: CPT | Performed by: INTERNAL MEDICINE

## 2022-11-15 PROCEDURE — 81001 URINALYSIS AUTO W/SCOPE: CPT | Performed by: INTERNAL MEDICINE

## 2022-11-15 PROCEDURE — 99214 OFFICE O/P EST MOD 30 MIN: CPT | Performed by: INTERNAL MEDICINE

## 2022-11-15 RX ORDER — METOPROLOL SUCCINATE 25 MG/1
25 TABLET, EXTENDED RELEASE ORAL DAILY
Qty: 90 TABLET | Refills: 0 | Status: SHIPPED | OUTPATIENT
Start: 2022-11-15 | End: 2023-02-24

## 2022-11-15 RX ORDER — SULFAMETHOXAZOLE/TRIMETHOPRIM 800-160 MG
1 TABLET ORAL 2 TIMES DAILY
Qty: 10 TABLET | Refills: 0 | Status: SHIPPED | OUTPATIENT
Start: 2022-11-15 | End: 2022-11-20

## 2022-11-15 ASSESSMENT — PAIN SCALES - GENERAL: PAINLEVEL: MILD PAIN (2)

## 2022-11-15 NOTE — PROGRESS NOTES
"  Assessment & Plan     Primary hypertension  Remains elevated.  Increase metoprolol to 25 mg once a day.  Follow-up in clinic as scheduled.  - metoprolol succinate ER (TOPROL XL) 25 MG 24 hr tablet  Dispense: 90 tablet; Refill: 0    Dysuria  Check UA.  Treat if UTI.  Refer to urology otherwise.  He is status post prostate cancer with prostatectomy.  - UA with Microscopic reflex to Culture - lab collect    Dyslipidemia  Numbers discussed with patient ASCVD risk calculator at 47%.  Statin strongly encouraged.  Patient declines at this time.  He may reconsider in the future.        36 minutes spent on the date of the encounter doing chart review, review of test results, interpretation of tests, patient visit and documentation        BMI:   Estimated body mass index is 26.61 kg/m  as calculated from the following:    Height as of this encounter: 1.727 m (5' 8\").    Weight as of this encounter: 79.4 kg (175 lb).           Return in about 2 months (around 1/15/2023) for Follow up.    Car Marshall MD  [facility] CANDI Woods is a 77 year old, presenting for the following health issues:  Follow Up and Urinary Problem      HPI     HTN  No ill SE after starting beta blocker.  BP remains elevated.      HL:  By labs  The 10-year ASCVD risk score (Lashae DK, et al., 2019) is: 47.1%    Values used to calculate the score:      Age: 77 years      Sex: Male      Is Non- : No      Diabetic: No      Tobacco smoker: No      Systolic Blood Pressure: 158 mmHg      Is BP treated: Yes      HDL Cholesterol: 55 mg/dL      Total Cholesterol: 223 mg/dL    :  Dysuria the last week.  Started with frequency.  This AM notes mar hematuria.  He has been exercising more recently, pushing himself from a CV perspective.      Genitourinary - Male  Onset/Duration: 3 days  Description:   Dysuria (painful urination): YES}  Hematuria (blood in urine): YES  Frequency: YES  Waking at night to " "urinate: YES  Hesitancy (delay in urine): YES  Retention (unable to empty): YES  Decrease in urinary flow: YES  Incontinence: YES  Progression of Symptoms:  worsening  Accompanying Signs & Symptoms:  Fever: No  Back/Flank pain: No  Urethral discharge: No  Testicle lumps/masses/pain: No  Nausea and/or vomiting: No  Abdominal pain: No  History:   History of frequent UTI s: No  History of kidney stones: No  History of hernias: No  Personal or Family history of Prostate problems: YES  Sexually active: No  Precipitating or alleviating factors: None  Therapies tried and outcome: none        Review of Systems         Objective    BP (!) 158/77 (BP Location: Left arm, Patient Position: Chair, Cuff Size: Adult Large)   Pulse 72   Temp 98.5  F (36.9  C) (Temporal)   Resp 18   Ht 1.727 m (5' 8\")   Wt 79.4 kg (175 lb)   SpO2 99%   BMI 26.61 kg/m    Body mass index is 26.61 kg/m .  Physical Exam   General:  Awake, alert and NAD  HEENT:  NCAT, MMM  RESP:  No accessory muscle use, no audible wheezing.   ABD:  no pulsatile mass, non protuberant  EXT: non obvious C/C/E  PSYCH: Pleasant, conversant, makes eye contact                        "

## 2022-11-15 NOTE — PATIENT INSTRUCTIONS
METOPROLOL:  Increase to a FULL pill a day    URINE test today    Cholesterol:  I recommend a statin.  We reviewed your numbers and risk.  These risks include heart disease, stroke and death.  If you change your mind and wish to reconsider your decision to not take a statin, feel free to let me know.

## 2022-11-17 LAB — BACTERIA UR CULT: NO GROWTH

## 2022-11-30 ENCOUNTER — VIRTUAL VISIT (OUTPATIENT)
Dept: UROLOGY | Facility: CLINIC | Age: 77
End: 2022-11-30
Payer: MEDICARE

## 2022-11-30 VITALS — HEIGHT: 69 IN | BODY MASS INDEX: 25.62 KG/M2 | WEIGHT: 173 LBS

## 2022-11-30 DIAGNOSIS — Z92.3 S/P RADIATION THERAPY: ICD-10-CM

## 2022-11-30 DIAGNOSIS — R31.0 GROSS HEMATURIA: ICD-10-CM

## 2022-11-30 DIAGNOSIS — C61 PROSTATE CANCER (H): Primary | ICD-10-CM

## 2022-11-30 DIAGNOSIS — Z90.79 S/P PROSTATECTOMY: ICD-10-CM

## 2022-11-30 PROCEDURE — 99204 OFFICE O/P NEW MOD 45 MIN: CPT | Mod: 95 | Performed by: UROLOGY

## 2022-11-30 ASSESSMENT — PAIN SCALES - GENERAL: PAINLEVEL: NO PAIN (0)

## 2022-11-30 NOTE — PROGRESS NOTES
Urology Consult History and Physical  [facility]   Name: Chuck Santacruz    MRN: [id number]   YOB: 1945       We were asked to see Chuck Santacruz at the request of Dr. Marshall for evaluation and treatment of prostate cancer, gross hematuria.        Chief Complaint:   Prostate cancer, gross hematuria     History is obtained from the patient and             History of Present Illness:   Chuck Santacruz is a 77 year old male who is being seen for evaluation of gross hematuria    Previously followed with Urology at [facility] - last seen 7/27/2020:  He has a history of radical prostatectomy in 2015, PSA at that time was an 11, he had a stage C, high-grade, positive margins, negative nodes, negative bone scan disease. He started Lupron radiation within a year after his surgery. His PSAs have been undetectable. He has had some improvement to his incontinence, he is still wearing pads, rectal dysfunction has been an issue, he has used a vacuum pump device in the past, discussion was had about PGE1 injections to check blood flow, a trial of imipramine, or using a clamp for incontinence.     TODAY 11/30/2022:  He had 3 months of radiation in early 2016    He continues to have incontinence and impotence  He wears a pad at night as well    On 11/15/22 and 11/26/22 he developed episodes of gross hematuria            Past Medical History:     Past Medical History:   Diagnosis Date     Erectile dysfunction      GERD (gastroesophageal reflux disease)      Hiatal hernia      Hyperlipidemia      Irritable bowel syndrome      Multiple adenomatous polyps      Mumps      Prostate cancer (H)      Tubular adenoma of colon             Past Surgical History:     Past Surgical History:   Procedure Laterality Date     DAVINCI PROSTATECTOMY N/A 11/09/2015    Procedure: DAVINCI PROSTATECTOMY;  Surgeon: Evens Browning MD;  Location:  OR     EYE SURGERY Right 11/01/2014    Cataract      GI SURGERY  07/01/2009    Small bowel  "resection     ORTHOPEDIC SURGERY Left     Knee arthroscopy     PROSTATE SURGERY       VASECTOMY              Social History:     Social History     Tobacco Use     Smoking status: Never     Smokeless tobacco: Never   Substance Use Topics     Alcohol use: Yes     Comment: 2 beers per week       History   Smoking Status     Never   Smokeless Tobacco     Never            Family History:   No family history on file.           Allergies:     Allergies   Allergen Reactions     Atorvastatin      Citalopram Hydrobromide [Citalopram]      Sertraline      Amoxicillin Rash            Medications:     Current Outpatient Medications   Medication Sig     metoprolol succinate ER (TOPROL XL) 25 MG 24 hr tablet Take 1 tablet (25 mg) by mouth daily     No current facility-administered medications for this visit.             Review of Systems:     Skin: negative  Eyes: negative  Ears/Nose/Throat: negative  Respiratory: No shortness of breath, dyspnea on exertion, cough, or hemoptysis  Cardiovascular: negative  Gastrointestinal: negative  Genitourinary: as above  Musculoskeletal: negative  Neurologic: negative  Psychiatric: negative  Hematologic/Lymphatic/Immunologic: negative  Endocrine: negative          Physical Exam:     PHYSICAL EXAM  Patient is a 77 year old  male   Vitals: Height 1.74 m (5' 8.5\"), weight 78.5 kg (173 lb).  Body mass index is 25.92 kg/m .  General Appearance Adult:   Alert, no acute distress, oriented  HENT: throat/mouth:normal, good dentition  Lungs: no respiratory distress, or pursed lip breathing  Heart: No obvious jugular venous distension present  Abdomen: non - distended  Musculoskeltal: extremities normal, no peripheral edema  Skin: no suspicious lesions or rashes  Neuro: Alert, oriented, speech and mentation normal  Psych: affect and mood normal          Data:   All laboratory data reviewed:    UA RESULTS:  Recent Labs   Lab Test 11/15/22  1114   COLOR Light Yellow   APPEARANCE Slightly Cloudy*   URINEGLC " Negative   URINEBILI Negative   URINEKETONE Negative   SG 1.025   UBLD Moderate*   URINEPH 6.0   PROTEIN 100*   UROBILINOGEN 0.2   NITRITE Negative   LEUKEST Moderate*   RBCU 10-25*   WBCU >100*      Urine Culture:  11/15/2022  = No growth     Prostate Specific Antigen Screen   Date Value Ref Range Status   10/13/2022 <0.01 0.00 - 4.00 ug/L Final      Lab Results   Component Value Date    CR 0.93 10/13/2022    CR 0.95 02/21/2017      PATHOLOGY:  11/9/2015:    FINAL DIAGNOSIS:   A: Prostate, prostatectomy-   - Acinar adenocarcinoma, 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,involving both lobes of   the prostate, and invades left seminal vesicle ( pT3b)   -Surgical margins are negative for malignancy   -0/16 lymph nodes negative           Impression and Plan:   Impression:   77-year-old man with history of Garland 4+5 = 9 prostate cancer status post a prostatectomy on 11/9/2015 with early salvage radiation therapy now with recent episode of gross hematuria      Plan:   Prostate cancer  - Reviewed his PSA which was undetectable  - I reviewed his pathology results from his prostatectomy    Gross hematuria  - We discussed the need to proceed with a hematuria work-up which includes a CT urogram and office cystoscopy  - Order for this placed  - We discussed the impact of radiation on the bladder and potential causes for gross hematuria and radiation cystitis versus secondary malignancy and that we need to rule these out  - This is an undiagnosed new problem with an uncertain prognosis     Thank you for the kind consultation.    Time spent: 20 minutes spent on the date of the encounter doing chart review, history and exam, documentation and further activities as noted above.    Randy William MD   Urology  [facility]  [facility] Phone: [phone]  [facility] Phone: [phone]       Chuck is a 77 year old who is being evaluated via a billable video visit.      How would you like  to obtain your AVS? [employer]ged[employer]  If the video visit is dropped, the invitation should be resent by: Text to cell phone: [phone]  Will anyone else be joining your video visit? No        Video-Visit Details    Video Start Time: 3:42 PM    Type of service:  Video Visit    Video End Time:3:52 PM    Originating Location (pt. Location): Home        Distant Location (provider location):  On-site    Platform used for Video Visit: [facility] [place]

## 2022-11-30 NOTE — LETTER
11/30/2022       RE: Chuck Santacruz  [address] [place] [address]     Dear Colleague,    Thank you for referring your patient, Chuck Santacruz, to the [facility] at [facility]. Please see a copy of my visit note below.    Urology Consult History and Physical  [facility]   Name: Chuck Santacruz    MRN: [id number]   YOB: 1945       We were asked to see Chuck Santacruz at the request of Dr. Marshall for evaluation and treatment of prostate cancer, gross hematuria.        Chief Complaint:   Prostate cancer, gross hematuria     History is obtained from the patient and             History of Present Illness:   Chuck Santacruz is a 77 year old male who is being seen for evaluation of gross hematuria    Previously followed with Urology at [facility] - last seen 7/27/2020:  He has a history of radical prostatectomy in 2015, PSA at that time was an 11, he had a stage C, high-grade, positive margins, negative nodes, negative bone scan disease. He started Lupron radiation within a year after his surgery. His PSAs have been undetectable. He has had some improvement to his incontinence, he is still wearing pads, rectal dysfunction has been an issue, he has used a vacuum pump device in the past, discussion was had about PGE1 injections to check blood flow, a trial of imipramine, or using a clamp for incontinence.     TODAY 11/30/2022:  He had 3 months of radiation in early 2016    He continues to have incontinence and impotence  He wears a pad at night as well    On 11/15/22 and 11/26/22 he developed episodes of gross hematuria            Past Medical History:     Past Medical History:   Diagnosis Date     Erectile dysfunction      GERD (gastroesophageal reflux disease)      Hiatal hernia      Hyperlipidemia      Irritable bowel syndrome      Multiple adenomatous polyps      Mumps      Prostate cancer (H)      Tubular adenoma of colon  "            Past Surgical History:     Past Surgical History:   Procedure Laterality Date     DAVINCI PROSTATECTOMY N/A 11/09/2015    Procedure: DAVINCI PROSTATECTOMY;  Surgeon: Evens Browning MD;  Location: [facility] OR     EYE SURGERY Right 11/01/2014    Cataract      GI SURGERY  07/01/2009    Small bowel resection     ORTHOPEDIC SURGERY Left     Knee arthroscopy     PROSTATE SURGERY       VASECTOMY              Social History:     Social History     Tobacco Use     Smoking status: Never     Smokeless tobacco: Never   Substance Use Topics     Alcohol use: Yes     Comment: 2 beers per week       History   Smoking Status     Never   Smokeless Tobacco     Never            Family History:   No family history on file.           Allergies:     Allergies   Allergen Reactions     Atorvastatin      Citalopram Hydrobromide [Citalopram]      Sertraline      Amoxicillin Rash            Medications:     Current Outpatient Medications   Medication Sig     metoprolol succinate ER (TOPROL XL) 25 MG 24 hr tablet Take 1 tablet (25 mg) by mouth daily     No current facility-administered medications for this visit.             Review of Systems:     Skin: negative  Eyes: negative  Ears/Nose/Throat: negative  Respiratory: No shortness of breath, dyspnea on exertion, cough, or hemoptysis  Cardiovascular: negative  Gastrointestinal: negative  Genitourinary: as above  Musculoskeletal: negative  Neurologic: negative  Psychiatric: negative  Hematologic/Lymphatic/Immunologic: negative  Endocrine: negative          Physical Exam:     PHYSICAL EXAM  Patient is a 77 year old  male   Vitals: Height 1.74 m (5' 8.5\"), weight 78.5 kg (173 lb).  Body mass index is 25.92 kg/m .  General Appearance Adult:   Alert, no acute distress, oriented  HENT: throat/mouth:normal, good dentition  Lungs: no respiratory distress, or pursed lip breathing  Heart: No obvious jugular venous distension present  Abdomen: non - distended  Musculoskeltal: extremities normal, " no peripheral edema  Skin: no suspicious lesions or rashes  Neuro: Alert, oriented, speech and mentation normal  Psych: affect and mood normal          Data:   All laboratory data reviewed:    UA RESULTS:  Recent Labs   Lab Test 11/15/22  1114   COLOR Light Yellow   APPEARANCE Slightly Cloudy*   URINEGLC Negative   URINEBILI Negative   URINEKETONE Negative   SG 1.025   UBLD Moderate*   URINEPH 6.0   PROTEIN 100*   UROBILINOGEN 0.2   NITRITE Negative   LEUKEST Moderate*   RBCU 10-25*   WBCU >100*      Urine Culture:  11/15/2022  = No growth     Prostate Specific Antigen Screen   Date Value Ref Range Status   10/13/2022 <0.01 0.00 - 4.00 ug/L Final      Lab Results   Component Value Date    CR 0.93 10/13/2022    CR 0.95 02/21/2017      PATHOLOGY:  11/9/2015:    FINAL DIAGNOSIS:   A: Prostate, prostatectomy-   - Acinar adenocarcinoma, 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,involving both lobes of   the prostate, and invades left seminal vesicle ( pT3b)   -Surgical margins are negative for malignancy   -0/16 lymph nodes negative           Impression and Plan:   Impression:   77-year-old man with history of [place] 4+5 = 9 prostate cancer status post a prostatectomy on 11/9/2015 with early salvage radiation therapy now with recent episode of gross hematuria      Plan:   Prostate cancer  - Reviewed his PSA which was undetectable  - I reviewed his pathology results from his prostatectomy    Gross hematuria  - We discussed the need to proceed with a hematuria work-up which includes a CT urogram and office cystoscopy  - Order for this placed  - We discussed the impact of radiation on the bladder and potential causes for gross hematuria and radiation cystitis versus secondary malignancy and that we need to rule these out  - This is an undiagnosed new problem with an uncertain prognosis     Thank you for the kind consultation.    Time spent: 20 minutes spent on the date of the encounter doing chart review, history and exam, documentation and  further activities as noted above.    Randy William MD   Urology  [facility]  [facility] Phone: [phone]  [facility] Phone: [phone]       Chuck is a 77 year old who is being evaluated via a billable video visit.      How would you like to obtain your AVS? MyChart  If the video visit is dropped, the invitation should be resent by: Text to cell phone: [phone]  Will anyone else be joining your video visit? No        Video-Visit Details  Video Start Time: 3:42 PM  Type of service:  Video Visit  Video End Time:3:52 PM  Originating Location (pt. Location): Home  Distant Location (provider location):  On-site    Platform used for Video Visit: Shyann       I have reviewed and confirmed nurses' notes for patient's medications, allergies, medical history, and surgical history.

## 2022-12-15 ENCOUNTER — IMMUNIZATION (OUTPATIENT)
Dept: FAMILY MEDICINE | Facility: CLINIC | Age: 77
End: 2022-12-15
Payer: MEDICARE

## 2022-12-15 DIAGNOSIS — Z23 HIGH PRIORITY FOR 2019-NCOV VACCINE: Primary | ICD-10-CM

## 2022-12-15 PROCEDURE — 91313 COVID-19 VACCINE BIVALENT BOOSTER 18+ (MODERNA): CPT

## 2022-12-15 PROCEDURE — 99207 PR NO CHARGE LOS: CPT

## 2022-12-15 PROCEDURE — 0134A COVID-19 VACCINE BIVALENT BOOSTER 18+ (MODERNA): CPT

## 2023-01-18 ENCOUNTER — OFFICE VISIT (OUTPATIENT)
Dept: FAMILY MEDICINE | Facility: CLINIC | Age: 78
End: 2023-01-18
Payer: COMMERCIAL

## 2023-01-18 VITALS
SYSTOLIC BLOOD PRESSURE: 172 MMHG | TEMPERATURE: 96.9 F | RESPIRATION RATE: 16 BRPM | WEIGHT: 175.8 LBS | HEIGHT: 69 IN | BODY MASS INDEX: 26.04 KG/M2 | DIASTOLIC BLOOD PRESSURE: 99 MMHG | HEART RATE: 59 BPM | OXYGEN SATURATION: 99 %

## 2023-01-18 DIAGNOSIS — I10 UNCONTROLLED HYPERTENSION: Primary | ICD-10-CM

## 2023-01-18 DIAGNOSIS — E78.5 DYSLIPIDEMIA: ICD-10-CM

## 2023-01-18 PROCEDURE — 99214 OFFICE O/P EST MOD 30 MIN: CPT | Performed by: INTERNAL MEDICINE

## 2023-01-18 RX ORDER — AMLODIPINE BESYLATE 5 MG/1
5 TABLET ORAL DAILY
Qty: 90 TABLET | Refills: 1 | Status: SHIPPED | OUTPATIENT
Start: 2023-01-18 | End: 2023-07-13

## 2023-01-18 ASSESSMENT — PAIN SCALES - GENERAL: PAINLEVEL: NO PAIN (0)

## 2023-01-18 NOTE — PROGRESS NOTES
"  Assessment & Plan     Uncontrolled hypertension  Discussed at length.  Pt Is having a hard time reconciling the need to lower BP and having to take medication.  We once again spent considerable time discussing ramifications of uncontrolled high blood pressure including heart attack stroke and death.  - amLODIPine (NORVASC) 5 MG tablet  Dispense: 90 tablet; Refill: 1    Dyslipidemia  Lipids are reviewed.  Patient has declined statin    I encouraged a 4 week RTC.  Pt will consider but appt not made prior to leaving.      36 minutes spent on the date of the encounter doing review of test results, interpretation of tests, patient visit and documentation        BMI:   Estimated body mass index is 26.34 kg/m  as calculated from the following:    Height as of this encounter: 1.74 m (5' 8.5\").    Weight as of this encounter: 79.7 kg (175 lb 12.8 oz).         Return in about 4 weeks (around 2/15/2023) for Follow up.    Car Marshall MD  [facility] [place] is a 77 year old, presenting for the following health issues:  Follow Up      HPI     HTN  Med noted.  BP substantially elevated  Home BP readings 160s systolic.    He denies chest pain, palpitations exertional dyspnea.    HL:  We had a lengthy discussion on this last visit. Pt has adamantly declined medication.        Review of Systems         Objective    BP (!) 172/99   Pulse 59   Temp 96.9  F (36.1  C) (Temporal)   Resp 16   Ht 1.74 m (5' 8.5\")   Wt 79.7 kg (175 lb 12.8 oz)   SpO2 99%   BMI 26.34 kg/m    Body mass index is 26.34 kg/m .   Wt Readings from Last 3 Encounters:   01/18/23 79.7 kg (175 lb 12.8 oz)   11/30/22 78.5 kg (173 lb)   11/15/22 79.4 kg (175 lb)       Physical Exam   GENERAL: healthy, alert and no distress  NECK: no adenopathy, no asymmetry, masses, or scars and thyroid normal to palpation  RESP: lungs clear to auscultation - no rales, rhonchi or wheezes  CV: regular rate and rhythm, normal S1 S2, no S3 " or S4, no murmur, click or rub, no peripheral edema and peripheral pulses strong  ABDOMEN: soft, nontender, no hepatosplenomegaly, no masses and bowel sounds normal  MS: no gross musculoskeletal defects noted, no edema

## 2023-01-18 NOTE — PATIENT INSTRUCTIONS
AMLODIPINE:  Take 5mg pill every morning    METOPROLOL:  Take in the evening with dinner or between dinner and bedtime.     I highly recommend a clinic visit in 4 weeks.

## 2023-02-24 DIAGNOSIS — I10 PRIMARY HYPERTENSION: ICD-10-CM

## 2023-02-24 RX ORDER — METOPROLOL SUCCINATE 25 MG/1
TABLET, EXTENDED RELEASE ORAL
Qty: 90 TABLET | Refills: 0 | Status: SHIPPED | OUTPATIENT
Start: 2023-02-24 | End: 2023-05-18

## 2023-05-17 DIAGNOSIS — I10 PRIMARY HYPERTENSION: ICD-10-CM

## 2023-05-18 RX ORDER — METOPROLOL SUCCINATE 25 MG/1
TABLET, EXTENDED RELEASE ORAL
Qty: 90 TABLET | Refills: 0 | Status: SHIPPED | OUTPATIENT
Start: 2023-05-18 | End: 2023-08-21

## 2023-07-13 DIAGNOSIS — I10 UNCONTROLLED HYPERTENSION: ICD-10-CM

## 2023-07-13 RX ORDER — AMLODIPINE BESYLATE 5 MG/1
TABLET ORAL
Qty: 90 TABLET | Refills: 1 | Status: SHIPPED | OUTPATIENT
Start: 2023-07-13 | End: 2023-10-16

## 2023-08-20 DIAGNOSIS — I10 PRIMARY HYPERTENSION: ICD-10-CM

## 2023-08-21 RX ORDER — METOPROLOL SUCCINATE 25 MG/1
TABLET, EXTENDED RELEASE ORAL
Qty: 90 TABLET | Refills: 0 | Status: SHIPPED | OUTPATIENT
Start: 2023-08-21 | End: 2023-11-21

## 2023-10-16 DIAGNOSIS — I10 UNCONTROLLED HYPERTENSION: ICD-10-CM

## 2023-10-17 RX ORDER — AMLODIPINE BESYLATE 5 MG/1
5 TABLET ORAL DAILY
Qty: 90 TABLET | Refills: 1 | Status: SHIPPED | OUTPATIENT
Start: 2023-10-17

## 2023-11-21 DIAGNOSIS — I10 PRIMARY HYPERTENSION: ICD-10-CM

## 2023-11-21 RX ORDER — METOPROLOL SUCCINATE 25 MG/1
TABLET, EXTENDED RELEASE ORAL
Qty: 90 TABLET | Refills: 0 | Status: SHIPPED | OUTPATIENT
Start: 2023-11-21

## 2024-01-13 ENCOUNTER — HEALTH MAINTENANCE LETTER (OUTPATIENT)
Age: 79
End: 2024-01-13

## 2025-01-25 ENCOUNTER — HEALTH MAINTENANCE LETTER (OUTPATIENT)
Age: 80
End: 2025-01-25